# Patient Record
Sex: MALE | HISPANIC OR LATINO | Employment: FULL TIME | URBAN - METROPOLITAN AREA
[De-identification: names, ages, dates, MRNs, and addresses within clinical notes are randomized per-mention and may not be internally consistent; named-entity substitution may affect disease eponyms.]

---

## 2022-08-23 ENCOUNTER — HOSPITAL ENCOUNTER (EMERGENCY)
Facility: CLINIC | Age: 27
Discharge: HOME | End: 2022-08-24
Attending: EMERGENCY MEDICINE | Admitting: EMERGENCY MEDICINE
Payer: COMMERCIAL

## 2022-08-23 VITALS
RESPIRATION RATE: 20 BRPM | SYSTOLIC BLOOD PRESSURE: 136 MMHG | DIASTOLIC BLOOD PRESSURE: 92 MMHG | TEMPERATURE: 97.8 F | HEART RATE: 87 BPM | OXYGEN SATURATION: 97 %

## 2022-08-23 DIAGNOSIS — S20.229A CONTUSION OF BACK, UNSPECIFIED LATERALITY, INITIAL ENCOUNTER: ICD-10-CM

## 2022-08-23 DIAGNOSIS — S43.401A SPRAIN OF RIGHT SHOULDER, UNSPECIFIED SHOULDER SPRAIN TYPE, INITIAL ENCOUNTER: ICD-10-CM

## 2022-08-23 PROCEDURE — 99284 EMERGENCY DEPT VISIT MOD MDM: CPT

## 2022-08-23 NOTE — Clinical Note
Jonathan Reyes Barrios was seen and treated in our emergency department on 8/23/2022.  He may return to work on 08/29/2022.       If you have any questions or concerns, please don't hesitate to call.      David Thakur MD

## 2022-08-24 ENCOUNTER — APPOINTMENT (OUTPATIENT)
Dept: GENERAL RADIOLOGY | Facility: CLINIC | Age: 27
End: 2022-08-24
Attending: EMERGENCY MEDICINE
Payer: COMMERCIAL

## 2022-08-24 PROCEDURE — 72072 X-RAY EXAM THORAC SPINE 3VWS: CPT

## 2022-08-24 PROCEDURE — 250N000013 HC RX MED GY IP 250 OP 250 PS 637: Performed by: EMERGENCY MEDICINE

## 2022-08-24 PROCEDURE — 73030 X-RAY EXAM OF SHOULDER: CPT | Mod: RT

## 2022-08-24 RX ORDER — IBUPROFEN 600 MG/1
600 TABLET, FILM COATED ORAL ONCE
Status: COMPLETED | OUTPATIENT
Start: 2022-08-24 | End: 2022-08-24

## 2022-08-24 RX ADMIN — IBUPROFEN 600 MG: 600 TABLET ORAL at 00:11

## 2022-08-24 ASSESSMENT — ENCOUNTER SYMPTOMS
WEAKNESS: 0
HEADACHES: 0
BACK PAIN: 1
NECK PAIN: 0
NUMBNESS: 0

## 2022-08-24 ASSESSMENT — ACTIVITIES OF DAILY LIVING (ADL): ADLS_ACUITY_SCORE: 35

## 2022-08-24 NOTE — ED PROVIDER NOTES
History   Chief Complaint:  Fall       HPI   Jonathan J Reyes Barrios is a 27 year old male who presents with his significant other for evaluation following a fall. The patient reports that his feet went out from under him and he fell backwards down 5 sets of stairs. He did not hit his head or lose consciousness. He reports having right shoulder and back pain currently. The patient denies chest pain, neck pain, headache, numbness or weakness.     Review of Systems   Cardiovascular: Negative for chest pain.   Musculoskeletal: Positive for back pain. Negative for neck pain.        + shoulder pain   Neurological: Negative for weakness, numbness and headaches.       Allergies:  The patient has no known allergies.     Medications:  The patient is currently on no regular medications.    Past Medical History:     The patient denies past medical history.     Social History:  The patient presents to the ED with his significant other  He works with windows    Physical Exam     Patient Vitals for the past 24 hrs:   BP Temp Pulse Resp SpO2   08/23/22 2306 (!) 136/92 97.8  F (36.6  C) 87 20 97 %       Physical Exam  Constitutional:       General: He is not in acute distress.     Appearance: He is not diaphoretic.   HENT:      Head: Atraumatic.   Eyes:      General: No scleral icterus.     Pupils: Pupils are equal, round, and reactive to light.   Cardiovascular:      Heart sounds: Normal heart sounds.   Pulmonary:      Effort: No respiratory distress.      Breath sounds: Normal breath sounds.   Abdominal:      General: Bowel sounds are normal.      Palpations: Abdomen is soft.      Tenderness: There is no abdominal tenderness.   Musculoskeletal:      Comments: Tenderness in low thoracic spine and right shoulder   Skin:     General: Skin is warm.      Findings: No rash.         Emergency Department Course   Imaging:  XR Thoracic Spine 3 Views   Final Result   IMPRESSION: The upper thoracic vertebrae are obscured by the patient's  shoulders on the lateral radiographs. Right apex curvature of the mid thoracic spine. Visualized vertebral body heights are preserved. Intervertebral disc spaces are normal for age.    Visualized portions of the lungs are clear.      XR Shoulder Right 3 Views   Final Result   IMPRESSION: No evidence of acute fracture or dislocation. Joint spaces are preserved. Soft tissues grossly unremarkable.         Report per radiology    Emergency Department Course:  Reviewed:  I reviewed nursing notes, vitals and past medical history    Assessments:  0007 I obtained history and examined the patient as noted above.   0155 I rechecked the patient and explained findings.     Interventions:  0011 Ibuprofen, 600 mg, Oral    Disposition:  The patient was discharged to home.     Impression & Plan   Medical Decision Making:  This patient presents to the ED after slipping down some steps.  He did not hit his head or lose consciousness.  No prodromal lightheadedness or other symptoms.  X-rays do not demonstrate acute injuries.  He asked for a work note which was provided.    Diagnosis:    ICD-10-CM    1. Contusion of back, unspecified laterality, initial encounter  S20.229A    2. Sprain of right shoulder, unspecified shoulder sprain type, initial encounter  S43.401A        Discharge Medications:  There are no discharge medications for this patient.      Scribe Disclosure:  I, Merlin Rivera, am serving as a scribe at 12:09 AM on 8/24/2022 to document services personally performed by David Thakur MD based on my observations and the provider's statements to me.            David Thakur MD  08/24/22 [id number]

## 2022-08-24 NOTE — ED TRIAGE NOTES
Pt to ER with c/o tipping and falling down about 5 stairs , pt c/o pain to right shoulder and upper back , denies any LOC